# Patient Record
Sex: FEMALE | Race: WHITE | NOT HISPANIC OR LATINO | Employment: OTHER | URBAN - METROPOLITAN AREA
[De-identification: names, ages, dates, MRNs, and addresses within clinical notes are randomized per-mention and may not be internally consistent; named-entity substitution may affect disease eponyms.]

---

## 2021-02-08 PROBLEM — I10 ESSENTIAL (PRIMARY) HYPERTENSION: Chronic | Status: ACTIVE | Noted: 2021-02-08

## 2021-02-08 PROBLEM — E11.51 TYPE 2 DIABETES MELLITUS WITH DIABETIC PERIPHERAL ANGIOPATHY WITHOUT GANGRENE, WITHOUT LONG-TERM CURRENT USE OF INSULIN: Status: ACTIVE | Noted: 2021-02-08

## 2021-02-08 PROBLEM — E11.51 TYPE 2 DIABETES MELLITUS WITH DIABETIC PERIPHERAL ANGIOPATHY WITHOUT GANGRENE, WITHOUT LONG-TERM CURRENT USE OF INSULIN: Chronic | Status: ACTIVE | Noted: 2021-02-08

## 2021-02-08 PROBLEM — E03.9 HYPOTHYROIDISM IN ADULT: Chronic | Status: ACTIVE | Noted: 2021-02-08

## 2021-02-08 PROBLEM — E78.00 HYPERCHOLESTEREMIA: Chronic | Status: ACTIVE | Noted: 2021-02-08

## 2021-02-08 PROBLEM — M80.00XA AGE-RELATED OSTEOPOROSIS WITH CURRENT PATHOLOGICAL FRACTURE: Chronic | Status: ACTIVE | Noted: 2021-02-08

## 2021-02-08 PROBLEM — M80.00XA AGE-RELATED OSTEOPOROSIS WITH CURRENT PATHOLOGICAL FRACTURE: Status: ACTIVE | Noted: 2021-02-08

## 2021-02-08 PROBLEM — S42.202A CLOSED FRACTURE OF LEFT PROXIMAL HUMERUS: Status: ACTIVE | Noted: 2021-02-08

## 2021-02-08 PROBLEM — E03.9 HYPOTHYROIDISM IN ADULT: Status: ACTIVE | Noted: 2021-02-08

## 2021-02-08 PROBLEM — E78.00 HYPERCHOLESTEREMIA: Status: ACTIVE | Noted: 2021-02-08

## 2021-02-08 PROBLEM — I10 ESSENTIAL (PRIMARY) HYPERTENSION: Status: ACTIVE | Noted: 2021-02-08

## 2021-02-08 PROBLEM — S42.202A CLOSED FRACTURE OF LEFT PROXIMAL HUMERUS: Chronic | Status: ACTIVE | Noted: 2021-02-08

## 2021-03-15 PROBLEM — Z79.4 TYPE 2 DIABETES MELLITUS WITH DIABETIC PERIPHERAL ANGIOPATHY WITHOUT GANGRENE, WITH LONG-TERM CURRENT USE OF INSULIN: Status: ACTIVE | Noted: 2021-02-08

## 2021-03-30 PROBLEM — S42.292D FRACTURE, HUMERUS, HEAD, LEFT, WITH ROUTINE HEALING, SUBSEQUENT ENCOUNTER: Status: ACTIVE | Noted: 2021-03-30

## 2021-04-06 PROBLEM — Z00.00 HEALTH CARE MAINTENANCE: Status: ACTIVE | Noted: 2021-04-06

## 2021-04-06 PROBLEM — F01.50 VASCULAR DEMENTIA: Status: ACTIVE | Noted: 2021-04-06

## 2021-04-06 PROBLEM — D64.9 NORMOCYTIC ANEMIA: Status: ACTIVE | Noted: 2021-04-06

## 2021-04-06 PROBLEM — N18.32 STAGE 3B CHRONIC KIDNEY DISEASE: Status: ACTIVE | Noted: 2021-04-06

## 2021-04-19 PROBLEM — Z74.09 IMPAIRED FUNCTIONAL MOBILITY, BALANCE, GAIT, AND ENDURANCE: Status: ACTIVE | Noted: 2021-04-19

## 2021-05-12 PROBLEM — F01.50 VASCULAR DEMENTIA WITHOUT BEHAVIORAL DISTURBANCE: Chronic | Status: ACTIVE | Noted: 2021-04-06

## 2021-05-12 PROBLEM — N18.32 STAGE 3B CHRONIC KIDNEY DISEASE: Chronic | Status: ACTIVE | Noted: 2021-04-06

## 2021-05-12 PROBLEM — M80.00XD AGE-RELATED OSTEOPOROSIS WITH CURRENT PATHOLOGICAL FRACTURE WITH ROUTINE HEALING: Chronic | Status: ACTIVE | Noted: 2021-02-08

## 2021-05-12 PROBLEM — D64.9 NORMOCYTIC ANEMIA: Chronic | Status: ACTIVE | Noted: 2021-04-06

## 2021-05-12 PROBLEM — Z79.4 TYPE 2 DIABETES MELLITUS WITH DIABETIC PERIPHERAL ANGIOPATHY WITHOUT GANGRENE, WITH LONG-TERM CURRENT USE OF INSULIN: Chronic | Status: ACTIVE | Noted: 2021-02-08

## 2021-05-19 PROBLEM — I63.549: Status: ACTIVE | Noted: 2021-05-19

## 2021-05-19 PROBLEM — Z51.81 THERAPEUTIC DRUG MONITORING: Status: ACTIVE | Noted: 2021-05-19

## 2021-05-19 PROBLEM — Z00.00 HEALTH CARE MAINTENANCE: Status: RESOLVED | Noted: 2021-04-06 | Resolved: 2021-05-19

## 2021-05-19 PROBLEM — E66.3 OVERWEIGHT WITH BODY MASS INDEX (BMI) OF 26 TO 26.9 IN ADULT: Status: ACTIVE | Noted: 2021-05-19

## 2021-05-19 PROBLEM — Z86.73 HISTORY OF CEREBROVASCULAR ACCIDENT (CVA) DUE TO ISCHEMIA: Status: ACTIVE | Noted: 2021-05-19

## 2021-05-19 PROBLEM — Z71.3 DIETARY COUNSELING: Status: ACTIVE | Noted: 2021-05-19

## 2022-04-21 PROBLEM — S42.202A CLOSED FRACTURE OF LEFT PROXIMAL HUMERUS: Chronic | Status: RESOLVED | Noted: 2021-02-08 | Resolved: 2022-04-21

## 2022-04-21 PROBLEM — Z51.81 THERAPEUTIC DRUG MONITORING: Status: RESOLVED | Noted: 2021-05-19 | Resolved: 2022-04-21

## 2022-04-21 PROBLEM — Z74.09 IMPAIRED FUNCTIONAL MOBILITY, BALANCE, GAIT, AND ENDURANCE: Status: RESOLVED | Noted: 2021-04-19 | Resolved: 2022-04-21

## 2022-04-21 PROBLEM — S42.292D FRACTURE, HUMERUS, HEAD, LEFT, WITH ROUTINE HEALING, SUBSEQUENT ENCOUNTER: Status: RESOLVED | Noted: 2021-03-30 | Resolved: 2022-04-21

## 2022-04-21 PROBLEM — Z71.3 DIETARY COUNSELING: Status: RESOLVED | Noted: 2021-05-19 | Resolved: 2022-04-21

## 2022-04-21 PROBLEM — Z86.73 HISTORY OF CEREBROVASCULAR ACCIDENT (CVA) DUE TO ISCHEMIA: Status: RESOLVED | Noted: 2021-05-19 | Resolved: 2022-04-21

## 2022-08-19 PROBLEM — E66.3 OVERWEIGHT WITH BODY MASS INDEX (BMI) OF 27 TO 27.9 IN ADULT: Chronic | Status: ACTIVE | Noted: 2021-05-19

## 2023-09-02 PROBLEM — R06.02 SHORTNESS OF BREATH: Status: ACTIVE | Noted: 2023-09-02

## 2023-09-02 PROBLEM — I50.9 CHF (CONGESTIVE HEART FAILURE): Status: ACTIVE | Noted: 2023-09-02

## 2023-09-02 PROBLEM — I16.0 HYPERTENSIVE URGENCY: Status: ACTIVE | Noted: 2023-09-02

## 2023-09-02 PROBLEM — I10 UNCONTROLLED HYPERTENSION: Status: ACTIVE | Noted: 2023-09-02

## 2023-09-02 PROBLEM — I25.10 CAD (CORONARY ARTERY DISEASE): Status: ACTIVE | Noted: 2023-09-02

## 2023-09-02 PROBLEM — N17.9 AKI (ACUTE KIDNEY INJURY): Status: ACTIVE | Noted: 2023-09-02

## 2023-12-04 PROBLEM — N17.9 AKI (ACUTE KIDNEY INJURY): Status: RESOLVED | Noted: 2023-09-02 | Resolved: 2023-12-04

## 2023-12-31 PROBLEM — I16.1 HYPERTENSIVE EMERGENCY: Status: ACTIVE | Noted: 2023-12-31

## 2023-12-31 PROBLEM — I50.31 ACUTE HEART FAILURE WITH PRESERVED EJECTION FRACTION (HFPEF): Status: ACTIVE | Noted: 2023-12-31

## 2024-01-02 PROBLEM — J81.0 FLASH PULMONARY EDEMA: Status: ACTIVE | Noted: 2024-01-02

## 2024-01-25 PROBLEM — E87.5 HYPERKALEMIA: Status: ACTIVE | Noted: 2024-01-25

## 2024-01-25 PROBLEM — E86.0 DEHYDRATION: Status: ACTIVE | Noted: 2024-01-25

## 2024-01-26 PROBLEM — Z71.89 ADVANCED DIRECTIVES, COUNSELING/DISCUSSION: Status: ACTIVE | Noted: 2021-05-19

## 2024-04-08 PROBLEM — J81.0 FLASH PULMONARY EDEMA: Status: RESOLVED | Noted: 2024-01-02 | Resolved: 2024-04-08

## 2024-04-29 PROBLEM — N17.9 AKI (ACUTE KIDNEY INJURY): Status: RESOLVED | Noted: 2023-09-02 | Resolved: 2024-04-29

## 2024-08-04 PROBLEM — U07.1 COVID-19: Status: ACTIVE | Noted: 2024-08-04

## 2024-10-15 PROBLEM — J96.01 ACUTE HYPOXIC RESPIRATORY FAILURE: Status: ACTIVE | Noted: 2024-10-15

## 2024-10-15 PROBLEM — J96.01 ACUTE RESPIRATORY FAILURE WITH HYPOXIA: Status: ACTIVE | Noted: 2024-10-15

## 2024-10-18 ENCOUNTER — OUTPATIENT CASE MANAGEMENT (OUTPATIENT)
Dept: ADMINISTRATIVE | Facility: OTHER | Age: 89
End: 2024-10-18

## 2024-10-18 NOTE — PROGRESS NOTES
Outpatient Care Management  Patient Does Not Consent    Patient: Vane Fatima  MRN:  [id number]  Date of Service:  10/18/2024  Completed by:  Karen Tyson RN    Chief Complaint   Patient presents with    Case Closure       Patient Summary           Consent Received:  Decline  Decline Reason:  Not interested

## 2024-10-20 PROBLEM — R54 AGE-RELATED PHYSICAL DEBILITY: Status: ACTIVE | Noted: 2024-10-20

## 2024-10-20 PROBLEM — I50.32 CHRONIC DIASTOLIC CONGESTIVE HEART FAILURE: Status: ACTIVE | Noted: 2023-12-31

## 2025-01-22 PROBLEM — J10.1 INFLUENZA A: Status: ACTIVE | Noted: 2025-01-22

## 2025-05-28 PROBLEM — J45.20 MILD INTERMITTENT ASTHMA WITHOUT COMPLICATION: Status: ACTIVE | Noted: 2025-05-28

## 2025-05-28 PROBLEM — D64.9 ANEMIA: Status: ACTIVE | Noted: 2025-05-28

## 2025-05-28 PROBLEM — E11.9 TYPE 2 DIABETES MELLITUS, WITH LONG-TERM CURRENT USE OF INSULIN: Status: ACTIVE | Noted: 2025-05-28

## 2025-05-28 PROBLEM — E87.6 HYPOKALEMIA: Status: ACTIVE | Noted: 2025-05-28

## 2025-05-28 PROBLEM — K92.1 MELENA: Status: ACTIVE | Noted: 2025-05-28

## 2025-05-28 PROBLEM — I48.91 ATRIAL FIBRILLATION: Status: ACTIVE | Noted: 2025-05-28

## 2025-05-28 PROBLEM — Z79.4 TYPE 2 DIABETES MELLITUS, WITH LONG-TERM CURRENT USE OF INSULIN: Status: ACTIVE | Noted: 2025-05-28

## 2025-05-28 PROBLEM — E78.5 HYPERLIPIDEMIA: Status: ACTIVE | Noted: 2021-03-19

## 2025-06-06 PROBLEM — I50.33 ACUTE ON CHRONIC DIASTOLIC CONGESTIVE HEART FAILURE: Status: ACTIVE | Noted: 2023-12-31

## 2025-06-17 PROBLEM — N18.4 CHRONIC KIDNEY DISEASE (CKD), STAGE IV (SEVERE): Status: ACTIVE | Noted: 2025-06-17

## 2025-06-18 ENCOUNTER — OUTPATIENT CASE MANAGEMENT (OUTPATIENT)
Dept: ADMINISTRATIVE | Facility: OTHER | Age: OVER 89
End: 2025-06-18

## 2025-06-18 NOTE — PROGRESS NOTES
6/18/2025- Spoke with son, Nahid Fatima with [facility] in regards OPCM referral. Explained the purpose of OPCM.   Vane resides in [facility]. Nahid states patient has sitters x 6 hours in day and 3 hours over night. Nahid confirms that the sitters can assist patient in daily wt checks to CHF monitoring. Patient is able to adhere to follow up Cardio and Internal Medicine appts. Nahid denies having any additional needs at this time. He declines OPCM.    Referral closed.    Debra Ram RN